# Patient Record
Sex: MALE | Race: BLACK OR AFRICAN AMERICAN | NOT HISPANIC OR LATINO | URBAN - METROPOLITAN AREA
[De-identification: names, ages, dates, MRNs, and addresses within clinical notes are randomized per-mention and may not be internally consistent; named-entity substitution may affect disease eponyms.]

---

## 2019-11-16 ENCOUNTER — EMERGENCY (EMERGENCY)
Facility: HOSPITAL | Age: 64
LOS: 1 days | Discharge: HOME | End: 2019-11-16
Attending: EMERGENCY MEDICINE | Admitting: EMERGENCY MEDICINE
Payer: COMMERCIAL

## 2019-11-16 VITALS
OXYGEN SATURATION: 99 % | HEART RATE: 93 BPM | SYSTOLIC BLOOD PRESSURE: 198 MMHG | TEMPERATURE: 98 F | DIASTOLIC BLOOD PRESSURE: 91 MMHG | RESPIRATION RATE: 16 BRPM

## 2019-11-16 VITALS
RESPIRATION RATE: 16 BRPM | OXYGEN SATURATION: 96 % | HEART RATE: 74 BPM | DIASTOLIC BLOOD PRESSURE: 96 MMHG | SYSTOLIC BLOOD PRESSURE: 182 MMHG | TEMPERATURE: 99 F

## 2019-11-16 LAB
ALBUMIN SERPL ELPH-MCNC: 4.5 G/DL — SIGNIFICANT CHANGE UP (ref 3.3–5)
ALP SERPL-CCNC: 79 U/L — SIGNIFICANT CHANGE UP (ref 40–120)
ALT FLD-CCNC: 44 U/L — HIGH (ref 4–41)
ANION GAP SERPL CALC-SCNC: 14 MMO/L — SIGNIFICANT CHANGE UP (ref 7–14)
AST SERPL-CCNC: 42 U/L — HIGH (ref 4–40)
BASOPHILS # BLD AUTO: 0.06 K/UL — SIGNIFICANT CHANGE UP (ref 0–0.2)
BASOPHILS NFR BLD AUTO: 0.8 % — SIGNIFICANT CHANGE UP (ref 0–2)
BILIRUB SERPL-MCNC: 0.9 MG/DL — SIGNIFICANT CHANGE UP (ref 0.2–1.2)
BUN SERPL-MCNC: 18 MG/DL — SIGNIFICANT CHANGE UP (ref 7–23)
CALCIUM SERPL-MCNC: 9.7 MG/DL — SIGNIFICANT CHANGE UP (ref 8.4–10.5)
CHLORIDE SERPL-SCNC: 99 MMOL/L — SIGNIFICANT CHANGE UP (ref 98–107)
CK MB BLD-MCNC: 1 — SIGNIFICANT CHANGE UP (ref 0–2.5)
CK MB BLD-MCNC: 3.48 NG/ML — SIGNIFICANT CHANGE UP (ref 1–6.6)
CK SERPL-CCNC: 341 U/L — HIGH (ref 30–200)
CO2 SERPL-SCNC: 25 MMOL/L — SIGNIFICANT CHANGE UP (ref 22–31)
CREAT SERPL-MCNC: 1.3 MG/DL — SIGNIFICANT CHANGE UP (ref 0.5–1.3)
EOSINOPHIL # BLD AUTO: 0.12 K/UL — SIGNIFICANT CHANGE UP (ref 0–0.5)
EOSINOPHIL NFR BLD AUTO: 1.7 % — SIGNIFICANT CHANGE UP (ref 0–6)
GLUCOSE SERPL-MCNC: 104 MG/DL — HIGH (ref 70–99)
HCT VFR BLD CALC: 45.8 % — SIGNIFICANT CHANGE UP (ref 39–50)
HGB BLD-MCNC: 13.8 G/DL — SIGNIFICANT CHANGE UP (ref 13–17)
IMM GRANULOCYTES NFR BLD AUTO: 0.3 % — SIGNIFICANT CHANGE UP (ref 0–1.5)
LYMPHOCYTES # BLD AUTO: 1.86 K/UL — SIGNIFICANT CHANGE UP (ref 1–3.3)
LYMPHOCYTES # BLD AUTO: 26.3 % — SIGNIFICANT CHANGE UP (ref 13–44)
MAGNESIUM SERPL-MCNC: 2.2 MG/DL — SIGNIFICANT CHANGE UP (ref 1.6–2.6)
MCHC RBC-ENTMCNC: 26.8 PG — LOW (ref 27–34)
MCHC RBC-ENTMCNC: 30.1 % — LOW (ref 32–36)
MCV RBC AUTO: 88.9 FL — SIGNIFICANT CHANGE UP (ref 80–100)
MONOCYTES # BLD AUTO: 0.48 K/UL — SIGNIFICANT CHANGE UP (ref 0–0.9)
MONOCYTES NFR BLD AUTO: 6.8 % — SIGNIFICANT CHANGE UP (ref 2–14)
NEUTROPHILS # BLD AUTO: 4.54 K/UL — SIGNIFICANT CHANGE UP (ref 1.8–7.4)
NEUTROPHILS NFR BLD AUTO: 64.1 % — SIGNIFICANT CHANGE UP (ref 43–77)
NRBC # FLD: 0 K/UL — SIGNIFICANT CHANGE UP (ref 0–0)
PHOSPHATE SERPL-MCNC: 2.9 MG/DL — SIGNIFICANT CHANGE UP (ref 2.5–4.5)
PLATELET # BLD AUTO: 247 K/UL — SIGNIFICANT CHANGE UP (ref 150–400)
PMV BLD: 10.5 FL — SIGNIFICANT CHANGE UP (ref 7–13)
POTASSIUM SERPL-MCNC: 4.2 MMOL/L — SIGNIFICANT CHANGE UP (ref 3.5–5.3)
POTASSIUM SERPL-SCNC: 4.2 MMOL/L — SIGNIFICANT CHANGE UP (ref 3.5–5.3)
PROT SERPL-MCNC: 8.1 G/DL — SIGNIFICANT CHANGE UP (ref 6–8.3)
RBC # BLD: 5.15 M/UL — SIGNIFICANT CHANGE UP (ref 4.2–5.8)
RBC # FLD: 12.4 % — SIGNIFICANT CHANGE UP (ref 10.3–14.5)
SODIUM SERPL-SCNC: 138 MMOL/L — SIGNIFICANT CHANGE UP (ref 135–145)
WBC # BLD: 7.08 K/UL — SIGNIFICANT CHANGE UP (ref 3.8–10.5)
WBC # FLD AUTO: 7.08 K/UL — SIGNIFICANT CHANGE UP (ref 3.8–10.5)

## 2019-11-16 PROCEDURE — 99284 EMERGENCY DEPT VISIT MOD MDM: CPT

## 2019-11-16 NOTE — ED PROVIDER NOTE - ATTENDING CONTRIBUTION TO CARE
Dr. Eden:  I have personally performed a face to face bedside history and physical examination of this patient. I have discussed the history, examination, review of systems, assessment and plan of management with the resident. I have reviewed the electronic medical record and amended it to reflect my history, review of systems, physical exam, assessment and plan.    64M h/o kidney stone c/o weakness RLE and paresthesia/numbness R hand x 1 week, and L hand numbness that started this morning. Dr. Eden:  I have personally performed a face to face bedside history and physical examination of this patient. I have discussed the history, examination, review of systems, assessment and plan of management with the resident. I have reviewed the electronic medical record and amended it to reflect my history, review of systems, physical exam, assessment and plan.    64M h/o kidney stone c/o weakness RLE and paresthesia/numbness R hand x 1 week, and L hand numbness that started this morning.  Recent URI, denies other symptoms. Was playing basketball preceding these symptoms but no apparent injury.    Exam:  - nad  - rrr   -ctab   -abd soft ntnd  - no focal neuro deficits on exam    A/p  - paresthesias, eval electrolyte abnormalities; less likely stroke given normal neuro exam; consider radiculopathy/neuropathy; unlikely Guillan-barre as no weakness on exam  - cbc, cmp, mg, phos, CT head

## 2019-11-16 NOTE — ED ADULT TRIAGE NOTE - CHIEF COMPLAINT QUOTE
Pt st" On Monday the tips of my rt fingers felt numb after washing dishes and then I slept and woke up with the same numbness, since wednesday my rt leg does not feel right when I bend it doesn't feel right it buckels..last night I felt nauseus and sweating alittle...then....this morning in left hand the 4th & 5th finger felt numb." Denies pain , denies nausea, Pt st" Just the numbness in fingers both hands now."  Denies med hx. gait is steady, speech is clear, no facial droop, upper ext=strength, no drift , lower ext = strength.

## 2019-11-16 NOTE — ED PROVIDER NOTE - CLINICAL SUMMARY MEDICAL DECISION MAKING FREE TEXT BOX
65 y/o M p/w numbness tingling of fingers and subjective rt leg weakness. No difficulty ambulating, neuro exam benign. Ddx includes neuropathy vs DM vs psychosomatic. obtain routine labs, likely d/c with pcp f/u. 65 y/o M p/w numbness tingling of fingers and subjective rt leg weakness. No difficulty ambulating, neuro exam benign. Ddx includes neuropathy vs DM vs psychosomatic. obtain routine labs, likely d/c with pcp and neurology f/u.

## 2019-11-16 NOTE — ED PROVIDER NOTE - NSFOLLOWUPINSTRUCTIONS_ED_ALL_ED_FT
You were seen in the Emergency Department for numbness/tingling hands and right leg weakness.   1) Advance activity as tolerated.    2) Continue all previously prescribed medications as directed.    3) Follow up with your primary care physician in 24-48 hours - take copies of your results.    4) Return to the Emergency Department for worsening or persistent symptoms, and/or ANY NEW OR CONCERNING SYMPTOMS including but not limited to changes in strength/sensation in muscle or arms, loss of consciousness, changes in vision, difficulty walking.

## 2019-11-16 NOTE — ED ADULT NURSE NOTE - OBJECTIVE STATEMENT
Received pt to room 12 ambulatory A&Ox4 hx of kidney stones, no other known medical problems, here with weakness of rt leg, numbness of right digits 2-5, left hand 4th/5th digit numbness. Denies nvd, difficulty ambulating, changes in strength/sensation in arms, visual changes. No increased urinary frequency, dizziness, polyphagia/polydipsia. IV placed, labs sent, VS as documented, will continue to monitor.

## 2019-11-16 NOTE — ED PROVIDER NOTE - OBJECTIVE STATEMENT
65 y/o M w/ hx of kidney stones, no other known medical problems, here with weakness of rt leg, numbness of right digits 2-5, left hand 4th/5th digit numbness. Denies nvd, difficulty ambulating, changes in strength/sensation in arms, visual changes. No increased urinary frequency, dizziness, polyphagia/polydipsia.

## 2019-11-16 NOTE — ED PROVIDER NOTE - PHYSICAL EXAMINATION
[Const] well-appearing, resting comfortably, no acute distress  [HEENT] PERRL, EOM, MMM  [Neck] Supple, trachea midline  [CV] +S1/S2, no m/r/g appreciated  [Lungs] CTABL, no adventitious lung sounds  [Abd] soft, non-tender, nondistended in all 4 quadrants  [MSK] 5/5 UE and LR str BL  [Skin] warm, dry, well-perfused  [Neuro] A&Ox3, CN II-XII intact, neg rhomberg/dysmetria/ataxia, gait intact, neg pronator drift, soft/sharp sensation intact BL in hands and feet, negative tinel/phalen's

## 2019-11-16 NOTE — ED PROVIDER NOTE - PATIENT PORTAL LINK FT
You can access the FollowMyHealth Patient Portal offered by [facility] by registering at the following website: [email or web address]. By joining [facility]’s FollowMyHealth portal, you will also be able to view your health information using other applications (apps) compatible with our system.

## 2019-11-17 PROCEDURE — 70450 CT HEAD/BRAIN W/O DYE: CPT | Mod: 26

## 2024-08-15 ENCOUNTER — APPOINTMENT (OUTPATIENT)
Dept: ORTHOPEDIC SURGERY | Facility: CLINIC | Age: 69
End: 2024-08-15
Payer: MEDICARE

## 2024-08-15 VITALS — HEIGHT: 67 IN | WEIGHT: 185 LBS | BODY MASS INDEX: 29.03 KG/M2

## 2024-08-15 DIAGNOSIS — M54.16 RADICULOPATHY, LUMBAR REGION: ICD-10-CM

## 2024-08-15 DIAGNOSIS — M43.17 SPONDYLOLISTHESIS, LUMBOSACRAL REGION: ICD-10-CM

## 2024-08-15 DIAGNOSIS — Z78.9 OTHER SPECIFIED HEALTH STATUS: ICD-10-CM

## 2024-08-15 PROCEDURE — 72110 X-RAY EXAM L-2 SPINE 4/>VWS: CPT

## 2024-08-15 PROCEDURE — 99203 OFFICE O/P NEW LOW 30 MIN: CPT

## 2024-08-16 NOTE — IMAGING
[de-identified] : Back: - No obvious deformity - No pain with palpation of spinous processes - Mild pain with palpation of bilateral lumbar paraspinals - No pain with SI palpation - ROM intact throughout extension, sidebending, and rotation.  Mildly decreased forward flexion.  No pain - 5/5 strength throughout LE evaluation bilaterally - No pain with MARY - Negative straight leg raise bilaterally.  Tight hamstrings bilaterally - Distally neurovascularly intact [Facet arthropathy] : Facet arthropathy [Disc space narrowing] : Disc space narrowing [Spondylolithesis] : Spondylolithesis

## 2024-08-16 NOTE — HISTORY OF PRESENT ILLNESS
[de-identified] : 8/15/2024: Patient is a 69 male presenting for evaluation of lumbar pain that began around 3 months ago without inciting injury which has recently improved on its own. He noticed worsening pain on 8/2/24. Patient was evaluated at  [facility]. He was prescribed Tylenol, diazepam, Naproxen, Flexeril. He was given a Toradol injection while at the hospital. He feels the pain radiate down his left leg. He complains of numbness and tingling. He notices improvement when bending forward. He is doing stretches on his own. Denies prior injury, saddle anesthesia, bowel or bladder incontinence. He is a retired .

## 2024-08-16 NOTE — IMAGING
[de-identified] : Back: - No obvious deformity - No pain with palpation of spinous processes - Mild pain with palpation of bilateral lumbar paraspinals - No pain with SI palpation - ROM intact throughout extension, sidebending, and rotation.  Mildly decreased forward flexion.  No pain - 5/5 strength throughout LE evaluation bilaterally - No pain with MARY - Negative straight leg raise bilaterally.  Tight hamstrings bilaterally - Distally neurovascularly intact [Facet arthropathy] : Facet arthropathy [Disc space narrowing] : Disc space narrowing [Spondylolithesis] : Spondylolithesis

## 2024-08-16 NOTE — HISTORY OF PRESENT ILLNESS
[de-identified] : 8/15/2024: Patient is a 69 male presenting for evaluation of lumbar pain that began around 3 months ago without inciting injury which has recently improved on its own. He noticed worsening pain on 8/2/24. Patient was evaluated at  [facility]. He was prescribed Tylenol, diazepam, Naproxen, Flexeril. He was given a Toradol injection while at the hospital. He feels the pain radiate down his left leg. He complains of numbness and tingling. He notices improvement when bending forward. He is doing stretches on his own. Denies prior injury, saddle anesthesia, bowel or bladder incontinence. He is a retired .

## 2024-08-16 NOTE — ASSESSMENT
[FreeTextEntry1] : Acute episode of back pain without any injury 3 months ago that has now been improving on its own.  Initially radicular sounding.  Exam today largely normal.  X-ray showing degenerative disc space disease and L5-S1 spondylolisthesis - Physical therapy referral - NSAID, muscle relaxer, Tylenol as needed - Follow-up in 2 months if needed

## 2024-10-17 ENCOUNTER — APPOINTMENT (OUTPATIENT)
Dept: ORTHOPEDIC SURGERY | Facility: CLINIC | Age: 69
End: 2024-10-17
Payer: MEDICARE

## 2024-10-17 DIAGNOSIS — M43.17 SPONDYLOLISTHESIS, LUMBOSACRAL REGION: ICD-10-CM

## 2024-10-17 DIAGNOSIS — M54.16 RADICULOPATHY, LUMBAR REGION: ICD-10-CM

## 2024-10-17 PROCEDURE — 99213 OFFICE O/P EST LOW 20 MIN: CPT
